# Patient Record
Sex: FEMALE | Race: WHITE | NOT HISPANIC OR LATINO | Employment: FULL TIME | URBAN - METROPOLITAN AREA
[De-identification: names, ages, dates, MRNs, and addresses within clinical notes are randomized per-mention and may not be internally consistent; named-entity substitution may affect disease eponyms.]

---

## 2024-11-04 ENCOUNTER — OFFICE VISIT (OUTPATIENT)
Dept: UROLOGY | Facility: CLINIC | Age: 49
End: 2024-11-04
Payer: COMMERCIAL

## 2024-11-04 ENCOUNTER — HOSPITAL ENCOUNTER (OUTPATIENT)
Dept: RADIOLOGY | Facility: CLINIC | Age: 49
Discharge: HOME | End: 2024-11-04
Payer: COMMERCIAL

## 2024-11-04 ENCOUNTER — CLINICAL SUPPORT (OUTPATIENT)
Dept: UROLOGY | Facility: CLINIC | Age: 49
End: 2024-11-04
Payer: COMMERCIAL

## 2024-11-04 VITALS
DIASTOLIC BLOOD PRESSURE: 88 MMHG | HEIGHT: 67 IN | HEART RATE: 84 BPM | BODY MASS INDEX: 33.59 KG/M2 | SYSTOLIC BLOOD PRESSURE: 150 MMHG | WEIGHT: 214 LBS

## 2024-11-04 DIAGNOSIS — N20.0 KIDNEY STONE: Primary | ICD-10-CM

## 2024-11-04 DIAGNOSIS — N20.0 KIDNEY STONE: ICD-10-CM

## 2024-11-04 PROCEDURE — 74018 RADEX ABDOMEN 1 VIEW: CPT | Mod: TC,,, | Performed by: UROLOGY

## 2024-11-04 PROCEDURE — 1159F MED LIST DOCD IN RCRD: CPT | Mod: CPTII,S$GLB,,

## 2024-11-04 PROCEDURE — 74018 RADEX ABDOMEN 1 VIEW: CPT | Mod: 26,,, | Performed by: RADIOLOGY

## 2024-11-04 PROCEDURE — 3008F BODY MASS INDEX DOCD: CPT | Mod: CPTII,S$GLB,,

## 2024-11-04 PROCEDURE — 3079F DIAST BP 80-89 MM HG: CPT | Mod: CPTII,S$GLB,,

## 2024-11-04 PROCEDURE — 3077F SYST BP >= 140 MM HG: CPT | Mod: CPTII,S$GLB,,

## 2024-11-04 PROCEDURE — 99204 OFFICE O/P NEW MOD 45 MIN: CPT | Mod: S$GLB,,,

## 2024-11-04 PROCEDURE — 1160F RVW MEDS BY RX/DR IN RCRD: CPT | Mod: CPTII,S$GLB,,

## 2024-11-04 RX ORDER — HYDROCODONE BITARTRATE AND ACETAMINOPHEN 5; 325 MG/1; MG/1
TABLET ORAL
COMMUNITY
Start: 2024-11-01

## 2024-11-04 RX ORDER — MULTIVITAMIN
1 TABLET ORAL DAILY
COMMUNITY

## 2024-11-04 RX ORDER — ZINC GLUCONATE 50 MG
50 TABLET ORAL DAILY
COMMUNITY

## 2024-11-04 RX ORDER — CHOLECALCIFEROL (VITAMIN D3) 25 MCG
1000 TABLET ORAL DAILY
COMMUNITY

## 2024-11-04 RX ORDER — CIPROFLOXACIN 500 MG/1
500 TABLET ORAL 2 TIMES DAILY
COMMUNITY
Start: 2024-11-01
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
## 2024-11-06 ENCOUNTER — TELEPHONE (OUTPATIENT)
Dept: UROLOGY | Facility: CLINIC | Age: 49
End: 2024-11-06
Payer: COMMERCIAL

## 2024-11-06 NOTE — TELEPHONE ENCOUNTER
Patient called inquiring if  had cancellation for today for surgery. Advised to keep procedure as scheduled on 11/15.  Stated she is beginning to have abdominal pressure- advised patient that can WNL with a renal stone. Advised patient to continuously monitor for worsening s/s(including but not  limited to) and present to ED for evaluation: worsening/extreme pain, nausea, vomiting, fever, etc. Patient verbalized understanding.

## 2024-11-06 NOTE — TELEPHONE ENCOUNTER
----- Message from Khadijah sent at 11/6/2024  8:56 AM CST -----  Contact: self  Patient is requesting a call back regarding surgery. Please call back at [phone]

## 2024-11-14 ENCOUNTER — TELEPHONE (OUTPATIENT)
Dept: UROLOGY | Facility: CLINIC | Age: 49
End: 2024-11-14
Payer: COMMERCIAL

## 2024-11-14 DIAGNOSIS — N20.0 KIDNEY STONE: Primary | ICD-10-CM

## 2024-11-14 NOTE — TELEPHONE ENCOUNTER
RTC, informed pt that [facility] will be contacting her around 2pm today inform her of surgery time for tomorrow morning. Pt voiced understanding.     ----- Message from Khadijah sent at 11/14/2024 11:55 AM CST -----  Contact: self  Patient is requesting a call back regarding procedure tomorrow (needs time). Please call back at [phone]

## 2024-11-15 ENCOUNTER — OUTSIDE PLACE OF SERVICE (OUTPATIENT)
Dept: UROLOGY | Facility: CLINIC | Age: 49
End: 2024-11-15

## 2024-11-15 DIAGNOSIS — N20.0 KIDNEY STONE: Primary | ICD-10-CM

## 2024-11-15 LAB — B-HCG UR QL: NEGATIVE

## 2024-11-15 PROCEDURE — 52332 CYSTOSCOPY AND TREATMENT: CPT | Mod: 51,RT,, | Performed by: UROLOGY

## 2024-11-15 PROCEDURE — 50590 FRAGMENTING OF KIDNEY STONE: CPT | Mod: RT,,, | Performed by: UROLOGY

## 2024-11-15 RX ORDER — HYDROCODONE BITARTRATE AND ACETAMINOPHEN 7.5; 325 MG/1; MG/1
1 TABLET ORAL EVERY 6 HOURS PRN
Qty: 15 TABLET | Refills: 0 | Status: SHIPPED | OUTPATIENT
Start: 2024-11-15

## 2024-11-15 RX ORDER — CIPROFLOXACIN 500 MG/1
500 TABLET ORAL 2 TIMES DAILY
Qty: 6 TABLET | Refills: 0 | Status: SHIPPED | OUTPATIENT
Start: 2024-11-15 | End: 2024-11-18

## 2024-11-18 ENCOUNTER — TELEPHONE (OUTPATIENT)
Dept: UROLOGY | Facility: CLINIC | Age: 49
End: 2024-11-18
Payer: COMMERCIAL

## 2024-11-18 DIAGNOSIS — N20.0 KIDNEY STONE: Primary | ICD-10-CM

## 2024-11-18 RX ORDER — OXYBUTYNIN CHLORIDE 5 MG/1
5 TABLET ORAL 3 TIMES DAILY
Qty: 30 TABLET | Refills: 0 | Status: SHIPPED | OUTPATIENT
Start: 2024-11-18

## 2024-11-18 NOTE — TELEPHONE ENCOUNTER
Pt c/o pressure in right rib area and asked if she can return to work with a stent in place, I advised there are usually no restrictions while having a stent, (she has an office position). Pt then states bladder spasms. I explained ESWL with stent on right may be the pressure she feels since she has never had a stent before, bladder spasms could be from the procedure itself and/or stent string but pt states she had bladder spasms before. She reports she is at the [facility] ED because she felt light headed and felt like passing out, she went in to be checked out. I advised since she was already there I would make note of this encounter.

## 2024-11-18 NOTE — TELEPHONE ENCOUNTER
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

## 2024-11-18 NOTE — TELEPHONE ENCOUNTER
Contacted pt, she has a complaint of Bladder spasms. Discussed with NP/BL, Latonia routed. Pt states she doesn't think she would be able to work with stent, advised it depends on work duties. Pt states she does a lot of heavy lifting, advised that the more active the more pain she will endure. It is totally up to her. Suggested trying the meds if it doesn't reliever her symptoms she can make that decisions. Pt verbalized understanding. BJGEM    ----- Message from Marguerite sent at 11/18/2024 10:57 AM CST -----  Regarding: questions about procedure  Contact: pt  Pt calling for nurse about questions about procedure and can be reached at [phone]     Thanks,

## 2024-11-19 ENCOUNTER — TELEPHONE (OUTPATIENT)
Dept: UROLOGY | Facility: CLINIC | Age: 49
End: 2024-11-19
Payer: COMMERCIAL

## 2024-11-19 DIAGNOSIS — N20.0 KIDNEY STONE: Primary | ICD-10-CM

## 2024-11-19 NOTE — TELEPHONE ENCOUNTER
Contacted pt, advised of results. EHR is suggesting Cleanup URS post ESWL. Pt agrees to proceeding, sx date 11/25/2024. Pt scheduled for education. BJP

## 2024-11-19 NOTE — TELEPHONE ENCOUNTER
----- Message from Maryellen sent at 11/19/2024  8:56 AM CST -----  Contact: self  Type:  Patient Returning Call    Who Called:Ariana Wills  Who Left Message for Patient:unsure  Does the patient know what this is regarding?:kidney stone er /f-u  Would the patient rather a call back or a response via [facility]?   Best Call Back Number:[phone]  Additional Information: n/a

## 2024-11-19 NOTE — TELEPHONE ENCOUNTER
Pt reports ED visit 11/18/24. CT done. She wanted to know what the plan of care was now.  I advised I would CT report and make sure provider reviews it.

## 2024-11-20 ENCOUNTER — CLINICAL SUPPORT (OUTPATIENT)
Dept: UROLOGY | Facility: CLINIC | Age: 49
End: 2024-11-20
Payer: COMMERCIAL

## 2024-11-20 DIAGNOSIS — N20.0 KIDNEY STONE: Primary | ICD-10-CM

## 2024-11-20 LAB
APPEARANCE, UA: CLEAR
B-HCG UR QL: NEGATIVE
BACTERIA SPEC CULT: ABNORMAL /HPF
BILIRUB UR QL STRIP: NEGATIVE MG/DL
COLOR UR: ABNORMAL
GLUCOSE (UA): NORMAL MG/DL
HGB UR QL STRIP: 250 /UL
KETONES UR QL STRIP: NEGATIVE MG/DL
LEUKOCYTE ESTERASE UR QL STRIP: 500 /UL
NITRITE UR QL STRIP: NEGATIVE
PH UR STRIP: 6 PH (ref 5–8)
PROT UR QL STRIP: 100 MG/DL
RBC #/AREA URNS HPF: ABNORMAL /HPF (ref 0–2)
SP GR UR STRIP: 1.01 (ref 1–1.03)
SPECIMEN COLLECTION METHOD, URINE: ABNORMAL
SQUAMOUS EPITHELIAL, UA: ABNORMAL /LPF
UROBILINOGEN UR STRIP-ACNC: NORMAL MG/DL
WBC #/AREA URNS HPF: ABNORMAL /HPF (ref 0–5)

## 2024-11-20 RX ORDER — TAMSULOSIN HYDROCHLORIDE 0.4 MG/1
0.4 CAPSULE ORAL
COMMUNITY
Start: 2024-11-19

## 2024-11-20 NOTE — PROGRESS NOTES
Consents and education completed for URS right, cleanup (stent exchange) at [facility] on 11/25/24. Pt given highlighted written instructions after I verbally went over them with her, copy of faxed pre op order and medication list. Noted pre op labs may not need to be re done since she recently did that. Provided pt with clinic phone number to call with any concerns or questions before or after procedure. Pt reports it has been very difficult to get a call to us and have us return the call. I apologized and explained she can message us via [facility].

## 2024-11-25 ENCOUNTER — TELEPHONE (OUTPATIENT)
Dept: UROLOGY | Facility: CLINIC | Age: 49
End: 2024-11-25

## 2024-11-25 ENCOUNTER — OUTSIDE PLACE OF SERVICE (OUTPATIENT)
Dept: UROLOGY | Facility: CLINIC | Age: 49
End: 2024-11-25

## 2024-11-25 DIAGNOSIS — N20.0 KIDNEY STONE: Primary | ICD-10-CM

## 2024-11-25 PROCEDURE — 52352 CYSTOURETERO W/STONE REMOVE: CPT | Mod: 58,RT,, | Performed by: UROLOGY

## 2024-11-25 PROCEDURE — 52332 CYSTOSCOPY AND TREATMENT: CPT | Mod: 58,51,RT, | Performed by: UROLOGY

## 2024-11-25 PROCEDURE — 74420 UROGRAPHY RTRGR +-KUB: CPT | Mod: 26,,, | Performed by: UROLOGY

## 2024-11-25 RX ORDER — CIPROFLOXACIN 500 MG/1
500 TABLET ORAL 2 TIMES DAILY
Qty: 6 TABLET | Refills: 0 | Status: SHIPPED | OUTPATIENT
Start: 2024-11-25 | End: 2024-11-28

## 2024-11-25 RX ORDER — HYDROCODONE BITARTRATE AND ACETAMINOPHEN 7.5; 325 MG/1; MG/1
1 TABLET ORAL EVERY 6 HOURS PRN
Qty: 15 TABLET | Refills: 0 | Status: SHIPPED | OUTPATIENT
Start: 2024-11-25

## 2024-11-25 NOTE — TELEPHONE ENCOUNTER
Returned request for call back. Spoke with patient in regards to earliest appointment for stent removal was 12/24/2024. She stated that she cannot wait until 12/24/2024 to have stent removed. Instructed her that I would let our manager know due to our  who normally takes care of this is out today. Verbalized understanding.                   ----- Message from Marguerite sent at 11/25/2024 10:28 AM CST -----  Contact: pt  Pt calling for follow up to have stint removed and can be reached at [phone].  1st available was 12/24/2024.    Thanks,

## 2024-11-26 ENCOUNTER — TELEPHONE (OUTPATIENT)
Dept: UROLOGY | Facility: CLINIC | Age: 49
End: 2024-11-26
Payer: COMMERCIAL

## 2024-11-26 DIAGNOSIS — N20.0 KIDNEY STONE: Primary | ICD-10-CM

## 2024-11-26 NOTE — TELEPHONE ENCOUNTER
Returned request for call back. Informed patient that our  was out again today & that Dr. Monahan is out all week. The patient feels that the cancellation of her cystoscopy with stent removal to be done on 12/10/2024 was accidental & she would like to be placed back on the schedule for 12/10/2024. She states that she needs to get back to work as soon as possible.                ----- Message from Khadijah sent at 11/26/2024  1:33 PM CST -----  Contact: self  Patient is requesting a call back regarding asking why procedure was canceled. Please call back at [phone]

## 2024-11-28 LAB
CALCULI COMPOSITION: NORMAL
CALCULI DESCRIPTION: NORMAL
CALCULI MASS: 255 MG
CALCULI PDF: NORMAL

## 2024-12-05 ENCOUNTER — OFFICE VISIT (OUTPATIENT)
Dept: FAMILY MEDICINE | Facility: CLINIC | Age: 49
End: 2024-12-05
Payer: COMMERCIAL

## 2024-12-05 ENCOUNTER — TELEPHONE (OUTPATIENT)
Dept: UROLOGY | Facility: CLINIC | Age: 49
End: 2024-12-05
Payer: COMMERCIAL

## 2024-12-05 VITALS
HEIGHT: 67 IN | RESPIRATION RATE: 20 BRPM | HEART RATE: 98 BPM | WEIGHT: 219.38 LBS | TEMPERATURE: 96 F | BODY MASS INDEX: 34.43 KG/M2 | OXYGEN SATURATION: 98 % | SYSTOLIC BLOOD PRESSURE: 146 MMHG | DIASTOLIC BLOOD PRESSURE: 82 MMHG

## 2024-12-05 DIAGNOSIS — N95.1 PERIMENOPAUSE: ICD-10-CM

## 2024-12-05 DIAGNOSIS — Z13.6 ENCOUNTER FOR SCREENING FOR CARDIOVASCULAR DISORDERS: ICD-10-CM

## 2024-12-05 DIAGNOSIS — Z12.4 SCREENING FOR MALIGNANT NEOPLASM OF CERVIX: ICD-10-CM

## 2024-12-05 DIAGNOSIS — Z12.11 SCREENING FOR MALIGNANT NEOPLASM OF COLON: ICD-10-CM

## 2024-12-05 DIAGNOSIS — Z12.39 ENCOUNTER FOR SCREENING FOR MALIGNANT NEOPLASM OF BREAST, UNSPECIFIED SCREENING MODALITY: ICD-10-CM

## 2024-12-05 DIAGNOSIS — Z13.6 SCREENING FOR CARDIOVASCULAR CONDITION: ICD-10-CM

## 2024-12-05 DIAGNOSIS — L40.9 PSORIASIS: ICD-10-CM

## 2024-12-05 DIAGNOSIS — R42 DIZZINESS: ICD-10-CM

## 2024-12-05 DIAGNOSIS — N20.0 RENAL STONES: Primary | ICD-10-CM

## 2024-12-05 DIAGNOSIS — R00.2 PALPITATIONS: ICD-10-CM

## 2024-12-05 DIAGNOSIS — Z23 IMMUNIZATION DUE: ICD-10-CM

## 2024-12-05 DIAGNOSIS — R21 RASH: ICD-10-CM

## 2024-12-05 DIAGNOSIS — L60.0 INGROWN TOENAIL: ICD-10-CM

## 2024-12-05 RX ORDER — NYSTATIN 100000 U/G
CREAM TOPICAL 2 TIMES DAILY
Qty: 60 G | Refills: 3 | Status: SHIPPED | OUTPATIENT
Start: 2024-12-05
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
## 2024-12-09 ENCOUNTER — TELEPHONE (OUTPATIENT)
Dept: UROLOGY | Facility: CLINIC | Age: 49
End: 2024-12-09
Payer: COMMERCIAL

## 2024-12-09 NOTE — TELEPHONE ENCOUNTER
Unable to confirm. No answer. Left message for arrival time for cystoscopy with stent removal for 8:15 AM. Instructed to call with any questions or concerns.

## 2024-12-10 ENCOUNTER — PROCEDURE VISIT (OUTPATIENT)
Dept: UROLOGY | Facility: CLINIC | Age: 49
End: 2024-12-10
Payer: COMMERCIAL

## 2024-12-10 VITALS
HEART RATE: 87 BPM | WEIGHT: 222 LBS | BODY MASS INDEX: 34.77 KG/M2 | SYSTOLIC BLOOD PRESSURE: 178 MMHG | OXYGEN SATURATION: 98 % | DIASTOLIC BLOOD PRESSURE: 86 MMHG | RESPIRATION RATE: 14 BRPM

## 2024-12-10 DIAGNOSIS — N20.0 KIDNEY STONE: ICD-10-CM

## 2024-12-10 NOTE — PROCEDURES
Cystoscopy    Date/Time: 12/10/2024 8:30 AM    Performed by: Dominic Monahan MD  Authorized by: Dominic Monahan MD    Consent Done?:  Yes (Written)  Timeout: prior to procedure the correct patient, procedure, and site was verified    Prep: patient was prepped and draped in usual sterile fashion    Anesthesia:  Intraurethral instillation  Position:  Supine  Anesthesia:  Intraurethral instillation  Patient sedated?: No    Preparation: Patient was prepped and draped in usual sterile fashion    Scope type:  Flexible cystoscope  Stent removed: Yes     Patient tolerated the procedure well with no immediate complications  Comments:      Patient was brought to the procedure room placed on the table in spine position. The patient was prepped and draped in the usual sterile fashion. The cystoscope was inserted into the urethra advanced the urethra and bladder were normal the stent was visualized it was grasped and removed the bladder was inspected found to be free of tumor stone or foreign body.  The patient tolerated the procedure well there were no complications

## 2024-12-10 NOTE — PATIENT INSTRUCTIONS
Patient Education       Ureteral Stent Discharge Instructions   About this topic   The kidneys remove waste from the blood and get rid of it through your urine. Normally, your urine drains from your kidneys through a narrow tube into your bladder and out of your body. This narrow tube is a ureter. Some diseases may block the flow of urine through your ureter. Then, your urine can back up, cause pain, damage your kidneys, and cause infection.  A ureteral stent is a thin tube that allows urine to drain from the body when the normal flow of urine through your ureter is blocked. The ends of the tube are shaped like a coil to hold the tube in place. One end rests in your bladder and the other end rests in your kidney. This tube keeps the ureter open so urine can pass from your kidneys into your bladder and out of the body.  How long you will need the stent will depend on the reason for it. Most often, the stent can be taken out in a few days or weeks. If it is needed longer than a few months, it will need to be replaced. Some kinds of stents can be left in for much longer. Your doctor will decide how long you will need your stent.  A stent may be used to bypass a blockage in the ureter caused by a stone, tumor, swelling, or narrowing.     What care is needed at home?   Ask your doctor what you need to do when you go home. Make sure you ask questions if you do not understand what the doctor says. This way you will know what you need to do.  Take all drugs as ordered by your doctor.  Drink 6 to 8 glasses of liquids each day to avoid an infection unless your doctor asks you to limit fluids for some other health problem.  If you have a stent with a thread that is outside your body, make sure you do not pull on the thread and pull the stent out.  What follow-up care is needed?   Your doctor may ask you to make visits to the office to check on your progress. Be sure to keep these visits.  A cystoscope is a telescope-like  device put into the opening where urine comes out of your body. It can also be used to place the stent or take out the stent. You may be given a local pain drug for this. Some stents have a string or thread attached to them that stays outside the body. You or the doctor can take out this kind of stent by pulling on the thread. Talk with your doctor about when you will have your stent removed.  If your stent needs to be in longer than 3 months, it will need to be exchanged to make sure it doesnt get stuck.  What drugs may be needed?   The doctor may order drugs to:  Help with pain  Relax bladder muscles  Prevent or fight an infection  Will physical activity be limited?   You may go back to your normal daily activity when your doctor tells you it is OK.  What problems could happen?   Infection  Bladder irritation  Blood in the urine  Ureteral injury  Stent moves out of place  Stent gets clogged  You are not able to pass urine  Scarring of the urethra or ureter  When do I need to call the doctor?   Signs of infection. These include a fever of 100.4°F (38°C) or higher; chills; pain with passing urine; urine changes color, becomes cloudy, or smells bad; or not able to pass urine.  Very bad pain  Lots of blood in your urine  Urine is dribbling out most of the time  Stent comes out  Teach Back: Helping You Understand   The Teach Back Method helps you understand the information we are giving you. The idea is simple. After talking with the staff, tell them in your own words what you were just told. This helps to make sure the staff has covered each thing clearly. It also helps to explain things that may have been a bit confusing. Before going home, make sure you are able to do these:  I can tell you about my procedure.  I can tell you how I will take care of the needle site.  I can tell you what I will do if I have a fever, pain with passing urine, my urine is cloudy or bloody, or other signs of problems.  Where can I learn  more?   [employer]  https://www.radiologyinfo.org/en/info.cfm?pg=ureteralnephro   [employer] [employer]  https://www.baus.org.uk/_userfiles/pages/files/Patients/Leaflets/Ureteric%20stent%20insertion.pdf   Last Reviewed Date   2019-08-23  Consumer Information Use and Disclaimer   This information is not specific medical advice and does not replace information you receive from your health care provider. This is only a brief summary of general information. It does NOT include all information about conditions, illnesses, injuries, tests, procedures, treatments, therapies, discharge instructions or life-style choices that may apply to you. You must talk with your health care provider for complete information about your health and treatment options. This information should not be used to decide whether or not to accept your health care providers advice, instructions or recommendations. Only your health care provider has the knowledge and training to provide advice that is right for you.  Copyright   Copyright © 2021 [employer] and its affiliates and/or licensors. All rights reserved.

## 2024-12-12 ENCOUNTER — PATIENT MESSAGE (OUTPATIENT)
Dept: FAMILY MEDICINE | Facility: CLINIC | Age: 49
End: 2024-12-12
Payer: COMMERCIAL

## 2024-12-12 PROBLEM — I77.810 ASCENDING AORTA DILATION: Status: ACTIVE | Noted: 2024-12-12

## 2025-01-13 ENCOUNTER — OFFICE VISIT (OUTPATIENT)
Dept: FAMILY MEDICINE | Facility: CLINIC | Age: 50
End: 2025-01-13
Payer: COMMERCIAL

## 2025-01-13 VITALS
SYSTOLIC BLOOD PRESSURE: 160 MMHG | WEIGHT: 228 LBS | OXYGEN SATURATION: 95 % | RESPIRATION RATE: 18 BRPM | HEART RATE: 85 BPM | DIASTOLIC BLOOD PRESSURE: 80 MMHG | BODY MASS INDEX: 35.79 KG/M2 | TEMPERATURE: 100 F | HEIGHT: 67 IN

## 2025-01-13 DIAGNOSIS — Z12.39 ENCOUNTER FOR SCREENING FOR MALIGNANT NEOPLASM OF BREAST, UNSPECIFIED SCREENING MODALITY: ICD-10-CM

## 2025-01-13 DIAGNOSIS — B35.1 ONYCHOMYCOSIS: ICD-10-CM

## 2025-01-13 DIAGNOSIS — L40.9 PSORIASIS: Primary | ICD-10-CM

## 2025-01-13 DIAGNOSIS — Z12.4 SCREENING FOR MALIGNANT NEOPLASM OF CERVIX: ICD-10-CM

## 2025-01-13 PROCEDURE — 3008F BODY MASS INDEX DOCD: CPT | Mod: CPTII,S$GLB,, | Performed by: FAMILY MEDICINE

## 2025-01-13 PROCEDURE — 3079F DIAST BP 80-89 MM HG: CPT | Mod: CPTII,S$GLB,, | Performed by: FAMILY MEDICINE

## 2025-01-13 PROCEDURE — 1159F MED LIST DOCD IN RCRD: CPT | Mod: CPTII,S$GLB,, | Performed by: FAMILY MEDICINE

## 2025-01-13 PROCEDURE — 99214 OFFICE O/P EST MOD 30 MIN: CPT | Mod: S$GLB,,, | Performed by: FAMILY MEDICINE

## 2025-01-13 PROCEDURE — 3077F SYST BP >= 140 MM HG: CPT | Mod: CPTII,S$GLB,, | Performed by: FAMILY MEDICINE

## 2025-01-13 RX ORDER — TERBINAFINE HYDROCHLORIDE 250 MG/1
250 TABLET ORAL DAILY
Qty: 90 TABLET | Refills: 0 | Status: SHIPPED | OUTPATIENT
Start: 2025-01-13

## 2025-01-13 RX ORDER — BETAMETHASONE VALERATE 1 MG/G
CREAM TOPICAL 2 TIMES DAILY
Qty: 45 G | Refills: 1 | Status: SHIPPED | OUTPATIENT
Start: 2025-01-13
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
## 2025-01-15 ENCOUNTER — PATIENT MESSAGE (OUTPATIENT)
Dept: FAMILY MEDICINE | Facility: CLINIC | Age: 50
End: 2025-01-15
Payer: COMMERCIAL

## 2025-01-17 LAB — Lab: NORMAL

## 2025-01-24 ENCOUNTER — PATIENT MESSAGE (OUTPATIENT)
Dept: FAMILY MEDICINE | Facility: CLINIC | Age: 50
End: 2025-01-24
Payer: COMMERCIAL

## 2025-04-11 DIAGNOSIS — B35.1 ONYCHOMYCOSIS: ICD-10-CM

## 2025-04-14 ENCOUNTER — CLINICAL SUPPORT (OUTPATIENT)
Dept: FAMILY MEDICINE | Facility: CLINIC | Age: 50
End: 2025-04-14
Payer: COMMERCIAL

## 2025-04-14 DIAGNOSIS — B35.1 ONYCHOMYCOSIS: ICD-10-CM

## 2025-04-14 DIAGNOSIS — Z79.899 ON LONG TERM DRUG THERAPY: Primary | ICD-10-CM

## 2025-04-14 RX ORDER — TERBINAFINE HYDROCHLORIDE 250 MG/1
250 TABLET ORAL DAILY
Qty: 90 TABLET | Refills: 0 | Status: SHIPPED | OUTPATIENT
Start: 2025-04-14

## 2025-04-15 LAB
ALBUMIN SERPL-MCNC: 4.6 G/DL (ref 3.5–5.2)
ALBUMIN/GLOB SERPL ELPH: 1.8 {RATIO} (ref 1–2.7)
ALP ISOS SERPL LEV INH-CCNC: 89 U/L (ref 35–105)
ALT (SGPT): 23 U/L (ref 0–33)
ANION GAP SERPL CALC-SCNC: 12 MMOL/L (ref 8–17)
AST SERPL-CCNC: 19 U/L (ref 0–32)
BILIRUBIN, TOTAL: 0.2 MG/DL (ref 0–1.2)
BUN/CREAT SERPL: 18.3 (ref 6–20)
CALCIUM SERPL-MCNC: 9.2 MG/DL (ref 8.6–10.2)
CARBON DIOXIDE, CO2: 26 MMOL/L (ref 22–29)
CHLORIDE: 102 MMOL/L (ref 98–107)
CREAT SERPL-MCNC: 0.59 MG/DL (ref 0.5–0.9)
GFR ESTIMATION: 109.05 ML/MIN/1.73M2
GLOBULIN: 2.6 G/DL (ref 1.5–4.5)
GLUCOSE: 101 MG/DL (ref 74–106)
POTASSIUM: 3.9 MMOL/L (ref 3.5–5.1)
PROT SNV-MCNC: 7.2 G/DL (ref 6.4–8.3)
SODIUM: 140 MMOL/L (ref 136–145)
UREA NITROGEN (BUN): 10.8 MG/DL (ref 6–20)

## 2025-04-16 ENCOUNTER — RESULTS FOLLOW-UP (OUTPATIENT)
Dept: PRIMARY CARE CLINIC | Facility: CLINIC | Age: 50
End: 2025-04-16

## 2025-05-09 ENCOUNTER — PATIENT MESSAGE (OUTPATIENT)
Facility: CLINIC | Age: 50
End: 2025-05-09
Payer: COMMERCIAL

## 2025-07-14 ENCOUNTER — OFFICE VISIT (OUTPATIENT)
Dept: FAMILY MEDICINE | Facility: CLINIC | Age: 50
End: 2025-07-14
Payer: COMMERCIAL

## 2025-07-14 VITALS
WEIGHT: 217.63 LBS | HEIGHT: 67 IN | TEMPERATURE: 99 F | SYSTOLIC BLOOD PRESSURE: 154 MMHG | RESPIRATION RATE: 18 BRPM | BODY MASS INDEX: 34.16 KG/M2 | DIASTOLIC BLOOD PRESSURE: 90 MMHG | HEART RATE: 77 BPM | OXYGEN SATURATION: 97 %

## 2025-07-14 DIAGNOSIS — Z12.11 SCREENING FOR MALIGNANT NEOPLASM OF COLON: ICD-10-CM

## 2025-07-14 DIAGNOSIS — M25.512 CHRONIC LEFT SHOULDER PAIN: ICD-10-CM

## 2025-07-14 DIAGNOSIS — Z79.899 ON LONG TERM DRUG THERAPY: ICD-10-CM

## 2025-07-14 DIAGNOSIS — Z12.39 ENCOUNTER FOR SCREENING FOR MALIGNANT NEOPLASM OF BREAST, UNSPECIFIED SCREENING MODALITY: ICD-10-CM

## 2025-07-14 DIAGNOSIS — S66.912S STRAIN OF LEFT WRIST, SEQUELA: ICD-10-CM

## 2025-07-14 DIAGNOSIS — G89.29 CHRONIC LEFT SHOULDER PAIN: ICD-10-CM

## 2025-07-14 DIAGNOSIS — M67.912 DISORDER OF ROTATOR CUFF, LEFT: Primary | ICD-10-CM

## 2025-07-14 DIAGNOSIS — R49.0 HOARSENESS: ICD-10-CM

## 2025-07-14 DIAGNOSIS — B35.1 ONYCHOMYCOSIS: ICD-10-CM

## 2025-07-14 DIAGNOSIS — K21.9 GASTROESOPHAGEAL REFLUX DISEASE, UNSPECIFIED WHETHER ESOPHAGITIS PRESENT: ICD-10-CM

## 2025-07-14 DIAGNOSIS — L40.9 PSORIASIS: ICD-10-CM

## 2025-07-14 LAB
ALBUMIN SERPL-MCNC: 4.4 G/DL (ref 3.5–5.2)
ALBUMIN/GLOB SERPL ELPH: 1.3 {RATIO} (ref 1–2.7)
ALP ISOS SERPL LEV INH-CCNC: 100 U/L (ref 35–105)
ALT (SGPT): 25 U/L (ref 0–33)
ANION GAP SERPL CALC-SCNC: 13 MMOL/L (ref 8–17)
AST SERPL-CCNC: 24 U/L (ref 0–32)
BILIRUBIN, TOTAL: 0.24 MG/DL (ref 0–1.2)
BUN/CREAT SERPL: 18.9 (ref 6–20)
CALCIUM SERPL-MCNC: 9.4 MG/DL (ref 8.6–10.2)
CARBON DIOXIDE, CO2: 27 MMOL/L (ref 22–29)
CHLORIDE: 101 MMOL/L (ref 98–107)
CREAT SERPL-MCNC: 0.96 MG/DL (ref 0.5–0.9)
GFR ESTIMATION: 71.63 ML/MIN/1.73M2
GLOBULIN: 3.3 G/DL (ref 1.5–4.5)
GLUCOSE: 108 MG/DL (ref 74–106)
POTASSIUM: 4.6 MMOL/L (ref 3.5–5.1)
PROT SNV-MCNC: 7.7 G/DL (ref 6.4–8.3)
SODIUM: 141 MMOL/L (ref 136–145)
UREA NITROGEN (BUN): 18.1 MG/DL (ref 6–20)

## 2025-07-14 PROCEDURE — 3080F DIAST BP >= 90 MM HG: CPT | Mod: CPTII,S$GLB,, | Performed by: FAMILY MEDICINE

## 2025-07-14 PROCEDURE — 99214 OFFICE O/P EST MOD 30 MIN: CPT | Mod: S$GLB,,, | Performed by: FAMILY MEDICINE

## 2025-07-14 PROCEDURE — 3077F SYST BP >= 140 MM HG: CPT | Mod: CPTII,S$GLB,, | Performed by: FAMILY MEDICINE

## 2025-07-14 PROCEDURE — 3008F BODY MASS INDEX DOCD: CPT | Mod: CPTII,S$GLB,, | Performed by: FAMILY MEDICINE

## 2025-07-14 PROCEDURE — 1159F MED LIST DOCD IN RCRD: CPT | Mod: CPTII,S$GLB,, | Performed by: FAMILY MEDICINE

## 2025-07-14 RX ORDER — PANTOPRAZOLE SODIUM 40 MG/1
40 TABLET, DELAYED RELEASE ORAL DAILY
Qty: 90 TABLET | Refills: 3 | Status: SHIPPED | OUTPATIENT
Start: 2025-07-14 | End: 2025-07-14

## 2025-07-14 RX ORDER — BETAMETHASONE VALERATE 1 MG/G
CREAM TOPICAL 2 TIMES DAILY
Qty: 45 G | Refills: 1 | Status: SHIPPED | OUTPATIENT
Start: 2025-07-14

## 2025-07-14 RX ORDER — PANTOPRAZOLE SODIUM 40 MG/1
40 TABLET, DELAYED RELEASE ORAL DAILY
Qty: 90 TABLET | Refills: 3 | Status: SHIPPED | OUTPATIENT
Start: 2025-07-14 | End: 2026-07-14

## 2025-07-14 RX ORDER — TERBINAFINE HYDROCHLORIDE 250 MG/1
250 TABLET ORAL DAILY
Qty: 90 TABLET | Refills: 0 | Status: SHIPPED | OUTPATIENT
Start: 2025-07-14
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
## 2025-07-15 ENCOUNTER — PATIENT MESSAGE (OUTPATIENT)
Dept: FAMILY MEDICINE | Facility: CLINIC | Age: 50
End: 2025-07-15
Payer: COMMERCIAL